# Patient Record
Sex: MALE | Race: BLACK OR AFRICAN AMERICAN | NOT HISPANIC OR LATINO | URBAN - METROPOLITAN AREA
[De-identification: names, ages, dates, MRNs, and addresses within clinical notes are randomized per-mention and may not be internally consistent; named-entity substitution may affect disease eponyms.]

---

## 2017-08-31 ENCOUNTER — OFFICE (OUTPATIENT)
Dept: URBAN - METROPOLITAN AREA OTHER 6 | Facility: OTHER | Age: 27
End: 2017-08-31

## 2017-08-31 VITALS
SYSTOLIC BLOOD PRESSURE: 130 MMHG | WEIGHT: 192 LBS | HEIGHT: 65 IN | HEART RATE: 68 BPM | DIASTOLIC BLOOD PRESSURE: 80 MMHG

## 2017-08-31 DIAGNOSIS — K50.90 CROHN'S DISEASE, UNSPECIFIED, WITHOUT COMPLICATIONS: ICD-10-CM

## 2017-08-31 PROCEDURE — 99212 OFFICE O/P EST SF 10 MIN: CPT

## 2018-02-26 ENCOUNTER — OFFICE (OUTPATIENT)
Dept: URBAN - METROPOLITAN AREA CLINIC 71 | Facility: CLINIC | Age: 28
End: 2018-02-26

## 2018-02-26 VITALS
WEIGHT: 188 LBS | HEART RATE: 60 BPM | DIASTOLIC BLOOD PRESSURE: 80 MMHG | HEIGHT: 65 IN | SYSTOLIC BLOOD PRESSURE: 124 MMHG

## 2018-02-26 DIAGNOSIS — K50.90 CROHN'S DISEASE, UNSPECIFIED, WITHOUT COMPLICATIONS: ICD-10-CM

## 2018-02-26 PROCEDURE — 99213 OFFICE O/P EST LOW 20 MIN: CPT

## 2018-06-04 ENCOUNTER — TELEPHONE (OUTPATIENT)
Dept: GASTROENTEROLOGY | Facility: CLINIC | Age: 28
End: 2018-06-04

## 2018-06-04 NOTE — TELEPHONE ENCOUNTER
CALLED STATING NEEDING MORE REFILL ON MERCAPTOPURINE 50MG ONCE DAY.  ADVISE HIM TO CALL HIS PHARMANCY, LUCIO MOURA, HAVE THEM SEND E-REQUEST.  HE WILL CALL GIANCARLO

## 2018-06-06 ENCOUNTER — TELEPHONE (OUTPATIENT)
Dept: GASTROENTEROLOGY | Facility: CLINIC | Age: 28
End: 2018-06-06

## 2018-06-15 RX ORDER — MERCAPTOPURINE 50 MG/1
50 TABLET ORAL DAILY
Qty: 90 TABLET | Refills: 0 | Status: SHIPPED | OUTPATIENT
Start: 2018-06-15 | End: 2018-09-21 | Stop reason: SDUPTHER

## 2018-08-27 ENCOUNTER — OFFICE VISIT (OUTPATIENT)
Dept: GASTROENTEROLOGY | Facility: CLINIC | Age: 28
End: 2018-08-27

## 2018-08-27 VITALS
SYSTOLIC BLOOD PRESSURE: 124 MMHG | WEIGHT: 200 LBS | HEIGHT: 65 IN | BODY MASS INDEX: 33.32 KG/M2 | DIASTOLIC BLOOD PRESSURE: 78 MMHG

## 2018-08-27 DIAGNOSIS — K50.00 CROHN'S DISEASE OF SMALL INTESTINE WITHOUT COMPLICATION (HCC): Primary | ICD-10-CM

## 2018-08-27 PROCEDURE — 99213 OFFICE O/P EST LOW 20 MIN: CPT | Performed by: INTERNAL MEDICINE

## 2018-08-27 RX ORDER — SERTRALINE HYDROCHLORIDE 100 MG/1
100 TABLET, FILM COATED ORAL DAILY
COMMUNITY
Start: 2018-06-19

## 2018-08-27 NOTE — PROGRESS NOTES
PATIENT INFORMATION  Lawson Felix       - 1990    CHIEF COMPLAINT  Chief Complaint   Patient presents with   • Crohn's Disease     6 mo follow up       HISTORY OF PRESENT ILLNESS  Here for CD follow u and no GI complaints and is stable o his azathioprine and his last colon was 2016 so due next May and had labs drawn at [facility]   No other health issues and we reviewed Biologics however he is doing so wel and tolerting treatment ifhis labs are good will stay the course      Crohn's Disease   This is a chronic problem. The current episode started more than 1 year ago. The problem occurs rarely. The problem has been unchanged. Associated symptoms include fatigue. Nothing aggravates the symptoms. Treatments tried: Meds are unchanged for years. The treatment provided significant relief.           REVIEW OF SYSTEMS  Review of Systems   Constitutional: Positive for fatigue.   All other systems reviewed and are negative.        ACTIVE PROBLEMS  There are no active problems to display for this patient.        PAST MEDICAL HISTORY  Past Medical History:   Diagnosis Date   • Crohn's disease (CMS/HCC)          SURGICAL HISTORY  Past Surgical History:   Procedure Laterality Date   • COLONOSCOPY  2016         FAMILY HISTORY  Family History   Problem Relation Age of Onset   • Colon cancer Neg Hx    • Colon polyps Neg Hx          SOCIAL HISTORY  Social History     Occupational History   • Not on file.     Social History Main Topics   • Smoking status: Never Smoker   • Smokeless tobacco: Never Used   • Alcohol use No   • Drug use: Unknown   • Sexual activity: Not on file         CURRENT MEDICATIONS    Current Outpatient Prescriptions:   •  mercaptopurine (PURINETHOL) 50 MG chemo tablet, Take 1 tablet by mouth Daily., Disp: 90 tablet, Rfl: 0  •  sertraline (ZOLOFT) 100 MG tablet, , Disp: , Rfl:     ALLERGIES  Patient has no known allergies.    VITALS  Vitals:    18 1407   BP: 124/78   Weight: 90.7 kg  "(200 lb)   Height: 165.1 cm (65\")       LAST RESULTS   Lab Requisition on 05/16/2016   Component Date Value Ref Range Status   • Case Report 05/16/2016    Final                    Value:Surgical Pathology Report                         Case: [id number]                                  Authorizing Provider:  Mark Cruz, Collected:           05/16/2016 10:34 AM                                 MD                                                                           Pathologist:           Denis Shahid MD      Received:            05/17/2016 08:01 AM          Specimens:   1) - Colon, Rt Colon Bx                                                                             2) - Colon, Transverse Bx                                                                           3) - Colon, Sigmoid Bx                                                                              4) - Colon, Rectal Bx                                                                     • Final Diagnosis 05/16/2016    Final                    Value:This result contains rich text formatting which cannot be displayed here.   • Gross Description 05/16/2016    Final                    Value:This result contains rich text formatting which cannot be displayed here.   • Microscopic Description 05/16/2016    Final                    Value:This result contains rich text formatting which cannot be displayed here.     No results found.    PHYSICAL EXAM  Physical Exam   Constitutional: He is oriented to person, place, and time. He appears well-developed and well-nourished.   HENT:   Head: Normocephalic and atraumatic.   Eyes: Pupils are equal, round, and reactive to light. Conjunctivae and EOM are normal. No scleral icterus.   Neck: Normal range of motion. Neck supple. No thyromegaly present.   Cardiovascular: Normal rate, regular rhythm, normal heart sounds and intact distal pulses.  Exam reveals no gallop.    No murmur " heard.  Pulmonary/Chest: Effort normal and breath sounds normal. He has no wheezes. He has no rales.   Abdominal: Soft. Bowel sounds are normal. He exhibits no shifting dullness, no distension, no fluid wave, no abdominal bruit, no ascites and no mass. There is no hepatosplenomegaly. There is no tenderness. There is no guarding and negative Watkins's sign. Hernia confirmed negative in the ventral area.   Musculoskeletal: Normal range of motion. He exhibits no edema.   Lymphadenopathy:     He has no cervical adenopathy.   Neurological: He is alert and oriented to person, place, and time.   Skin: Skin is warm and dry. No rash noted. He is not diaphoretic. No erythema.   Psychiatric: He has a normal mood and affect.       ASSESSMENT  Diagnoses and all orders for this visit:    Crohn's disease of small intestine without complication (CMS/HCC)    Other orders  -     sertraline (ZOLOFT) 100 MG tablet;           PLAN  Return in about 6 months (around 2/27/2019).    Consider [facility] labs next visit.

## 2018-09-19 RX ORDER — MERCAPTOPURINE 50 MG/1
TABLET ORAL
Qty: 90 TABLET | Refills: 0 | Status: CANCELLED | OUTPATIENT
Start: 2018-09-19

## 2018-09-21 RX ORDER — MERCAPTOPURINE 50 MG/1
50 TABLET ORAL DAILY
Qty: 90 TABLET | Refills: 0 | Status: SHIPPED | OUTPATIENT
Start: 2018-09-21 | End: 2019-01-03 | Stop reason: SDUPTHER

## 2018-09-24 RX ORDER — MERCAPTOPURINE 50 MG/1
TABLET ORAL
Qty: 90 TABLET | Refills: 0 | OUTPATIENT
Start: 2018-09-24

## 2019-01-09 RX ORDER — MERCAPTOPURINE 50 MG/1
50 TABLET ORAL DAILY
Qty: 90 TABLET | Refills: 0 | Status: SHIPPED | OUTPATIENT
Start: 2019-01-09 | End: 2019-07-14 | Stop reason: SDUPTHER

## 2019-02-25 ENCOUNTER — OFFICE VISIT (OUTPATIENT)
Dept: GASTROENTEROLOGY | Facility: CLINIC | Age: 29
End: 2019-02-25

## 2019-02-25 VITALS
SYSTOLIC BLOOD PRESSURE: 126 MMHG | BODY MASS INDEX: 34.52 KG/M2 | WEIGHT: 207.2 LBS | HEIGHT: 65 IN | DIASTOLIC BLOOD PRESSURE: 76 MMHG

## 2019-02-25 DIAGNOSIS — K50.00 CROHN'S DISEASE OF SMALL INTESTINE WITHOUT COMPLICATION (HCC): Primary | ICD-10-CM

## 2019-02-25 PROCEDURE — 99213 OFFICE O/P EST LOW 20 MIN: CPT | Performed by: INTERNAL MEDICINE

## 2019-02-25 NOTE — PROGRESS NOTES
"    PATIENT INFORMATION  Lawson Felix       - 1990    CHIEF COMPLAINT  Chief Complaint   Patient presents with   • Follow-up     6 mo follow up on crohn's       HISTORY OF PRESENT ILLNESS  Baseline BMs 2-3 a day and no bloting nor Nausea and no weith change, Reviewed his med and he is stable on 6MP bu tno recent CBC (last 2017)    Last colon was 2016 so due this year in May.            REVIEW OF SYSTEMS  Review of Systems   All other systems reviewed and are negative.        ACTIVE PROBLEMS  There are no active problems to display for this patient.        PAST MEDICAL HISTORY  Past Medical History:   Diagnosis Date   • Crohn's disease (CMS/HCC)          SURGICAL HISTORY  Past Surgical History:   Procedure Laterality Date   • COLONOSCOPY  2016         FAMILY HISTORY  Family History   Problem Relation Age of Onset   • Colon cancer Neg Hx    • Colon polyps Neg Hx          SOCIAL HISTORY  Social History     Occupational History   • Not on file   Tobacco Use   • Smoking status: Never Smoker   • Smokeless tobacco: Never Used   Substance and Sexual Activity   • Alcohol use: No   • Drug use: Not on file   • Sexual activity: Not on file       Debilities/Disabilities Identified: None    Emotional Behavior: Appropriate    CURRENT MEDICATIONS    Current Outpatient Medications:   •  mercaptopurine (PURINETHOL) 50 MG chemo tablet, Take 1 tablet by mouth Daily., Disp: 90 tablet, Rfl: 0  •  sertraline (ZOLOFT) 100 MG tablet, , Disp: , Rfl:     ALLERGIES  Patient has no known allergies.    VITALS  Vitals:    19 1351   BP: 126/76   Weight: 94 kg (207 lb 3.2 oz)   Height: 165.1 cm (65\")       LAST RESULTS   Lab Requisition on 2016   Component Date Value Ref Range Status   • Case Report 2016    Final                    Value:Surgical Pathology Report                         Case: [id number]                                  Authorizing Provider:  Mark Cruz, Collected:           " 05/16/2016 10:34 AM                                 MD                                                                           Pathologist:           Denis Shahid MD      Received:            05/17/2016 08:01 AM          Specimens:   1) - Colon, Rt Colon Bx                                                                             2) - Colon, Transverse Bx                                                                           3) - Colon, Sigmoid Bx                                                                              4) - Colon, Rectal Bx                                                                     • Final Diagnosis 05/16/2016    Final                    Value:This result contains rich text formatting which cannot be displayed here.   • Gross Description 05/16/2016    Final                    Value:This result contains rich text formatting which cannot be displayed here.   • Microscopic Description 05/16/2016    Final                    Value:This result contains rich text formatting which cannot be displayed here.     No results found.    PHYSICAL EXAM  Physical Exam   Constitutional: He is oriented to person, place, and time. He appears well-developed and well-nourished.   HENT:   Head: Normocephalic and atraumatic.   Eyes: Conjunctivae and EOM are normal. Pupils are equal, round, and reactive to light. No scleral icterus.   Neck: Normal range of motion. Neck supple. No thyromegaly present.   Cardiovascular: Normal rate, regular rhythm, normal heart sounds and intact distal pulses. Exam reveals no gallop.   No murmur heard.  Pulmonary/Chest: Effort normal and breath sounds normal. He has no wheezes. He has no rales.   Abdominal: Soft. Bowel sounds are normal. He exhibits no shifting dullness, no distension, no fluid wave, no abdominal bruit, no ascites and no mass. There is no hepatosplenomegaly. There is no tenderness. There is no guarding and negative Watkins's sign. Hernia confirmed  negative in the ventral area.   Musculoskeletal: Normal range of motion. He exhibits no edema.   Lymphadenopathy:     He has no cervical adenopathy.   Neurological: He is alert and oriented to person, place, and time.   Skin: Skin is warm and dry. No rash noted. He is not diaphoretic. No erythema.   Psychiatric: He has a normal mood and affect. His behavior is normal.       ASSESSMENT  Diagnoses and all orders for this visit:    Crohn's disease of small intestine without complication (CMS/[facility])  -     CBC & Differential  -     External Facility Surgical / Procedural Request; Future    Other orders  -     Obtain informed consent; Future          PLAN    Tashi see 6 months after his Colonoscopy in May, Will call with CBC results.  No Follow-up on file.

## 2019-05-13 ENCOUNTER — OUTSIDE FACILITY SERVICE (OUTPATIENT)
Dept: GASTROENTEROLOGY | Facility: CLINIC | Age: 29
End: 2019-05-13

## 2019-05-13 ENCOUNTER — LAB REQUISITION (OUTPATIENT)
Dept: LAB | Facility: HOSPITAL | Age: 29
End: 2019-05-13

## 2019-05-13 DIAGNOSIS — K50.00 CROHN'S DISEASE OF SMALL INTESTINE WITHOUT COMPLICATION (HCC): ICD-10-CM

## 2019-05-13 PROCEDURE — 88305 TISSUE EXAM BY PATHOLOGIST: CPT | Performed by: INTERNAL MEDICINE

## 2019-05-13 PROCEDURE — 45380 COLONOSCOPY AND BIOPSY: CPT | Performed by: INTERNAL MEDICINE

## 2019-05-14 LAB
CYTO UR: NORMAL
LAB AP CASE REPORT: NORMAL
PATH REPORT.FINAL DX SPEC: NORMAL
PATH REPORT.GROSS SPEC: NORMAL

## 2019-07-23 RX ORDER — MERCAPTOPURINE 50 MG/1
TABLET ORAL
Qty: 90 TABLET | Refills: 0 | Status: SHIPPED | OUTPATIENT
Start: 2019-07-23 | End: 2019-10-17 | Stop reason: SDUPTHER

## 2019-10-18 RX ORDER — MERCAPTOPURINE 50 MG/1
50 TABLET ORAL DAILY
Qty: 90 TABLET | Refills: 0 | Status: SHIPPED | OUTPATIENT
Start: 2019-10-18 | End: 2020-01-21

## 2019-11-21 ENCOUNTER — OFFICE VISIT (OUTPATIENT)
Dept: GASTROENTEROLOGY | Facility: CLINIC | Age: 29
End: 2019-11-21

## 2019-11-21 VITALS
DIASTOLIC BLOOD PRESSURE: 74 MMHG | WEIGHT: 206.2 LBS | BODY MASS INDEX: 34.35 KG/M2 | HEIGHT: 65 IN | SYSTOLIC BLOOD PRESSURE: 128 MMHG

## 2019-11-21 DIAGNOSIS — K50.10 CROHN'S DISEASE OF LARGE INTESTINE WITHOUT COMPLICATION (HCC): Primary | ICD-10-CM

## 2019-11-21 PROCEDURE — 99213 OFFICE O/P EST LOW 20 MIN: CPT | Performed by: INTERNAL MEDICINE

## 2019-11-21 NOTE — PROGRESS NOTES
"    PATIENT INFORMATION  Lawson Felix       - 1990    CHIEF COMPLAINT  Chief Complaint   Patient presents with   • Follow-up     6 mo follow up on Crohn's       HISTORY OF PRESENT ILLNESS  BMs regular after meals mostly 1-2 a day and sometimes 3 , No recent illnesses and O/W doing fine.     Labs from may show WBC 4.4 and normal LFTs    Reviewed his colon in 2019 so will recall in 3 years and keep him on his .5mg/kg dose due to its efficaciousness    Watch his WBC and follow            REVIEW OF SYSTEMS  Review of Systems   Respiratory: Positive for apnea.    All other systems reviewed and are negative.        ACTIVE PROBLEMS  There are no active problems to display for this patient.        PAST MEDICAL HISTORY  Past Medical History:   Diagnosis Date   • Crohn's disease (CMS/HCC)    • Sleep apnea          SURGICAL HISTORY  Past Surgical History:   Procedure Laterality Date   • COLONOSCOPY  2016         FAMILY HISTORY  Family History   Problem Relation Age of Onset   • Colon cancer Neg Hx    • Colon polyps Neg Hx          SOCIAL HISTORY  Social History     Occupational History   • Not on file   Tobacco Use   • Smoking status: Never Smoker   • Smokeless tobacco: Never Used   Substance and Sexual Activity   • Alcohol use: No   • Drug use: Not on file   • Sexual activity: Not on file       Debilities/Disabilities Identified: None    Emotional Behavior: Appropriate    CURRENT MEDICATIONS    Current Outpatient Medications:   •  mercaptopurine (PURINETHOL) 50 MG chemo tablet, Take 1 tablet by mouth Daily., Disp: 90 tablet, Rfl: 0  •  sertraline (ZOLOFT) 100 MG tablet, , Disp: , Rfl:     ALLERGIES  Patient has no known allergies.    VITALS  Vitals:    19 1044   BP: 128/74   Weight: 93.5 kg (206 lb 3.2 oz)   Height: 165.1 cm (65\")       LAST RESULTS   Lab Requisition on 2019   Component Date Value Ref Range Status   • Case Report 2019    Final                    Value:Surgical Pathology " Report                         Case: [id number]                                  Authorizing Provider:  Mark Cruz        Collected:           05/13/2019 08:40 AM                                 MD Thomas                                                                   Pathologist:           Prisca Lemos MD        Received:            05/13/2019 03:34 PM          Specimens:   1) - Colon, right colon bx                                                                          2) - Colon, left colon bx                                                                 • Final Diagnosis 05/13/2019    Final                    Value:This result contains rich text formatting which cannot be displayed here.   • Gross Description 05/13/2019    Final                    Value:This result contains rich text formatting which cannot be displayed here.   • Microscopic Description 05/13/2019    Final                    Value:This result contains rich text formatting which cannot be displayed here.     No results found.    PHYSICAL EXAM  Physical Exam   Constitutional: He is oriented to person, place, and time. He appears well-developed and well-nourished.   HENT:   Head: Normocephalic and atraumatic.   Eyes: Conjunctivae and EOM are normal. Pupils are equal, round, and reactive to light. No scleral icterus.   Neck: Normal range of motion. Neck supple. No thyromegaly present.   Cardiovascular: Normal rate, regular rhythm, normal heart sounds and intact distal pulses. Exam reveals no gallop.   No murmur heard.  Pulmonary/Chest: Effort normal and breath sounds normal. He has no wheezes. He has no rales.   Abdominal: Soft. Bowel sounds are normal. He exhibits no shifting dullness, no distension, no fluid wave, no abdominal bruit, no ascites and no mass. There is no hepatosplenomegaly. There is tenderness in the right lower quadrant. There is no guarding and negative Watkins's sign. Hernia confirmed negative in the ventral  area.   Minimal tenderness to Deep palpation   Musculoskeletal: Normal range of motion. He exhibits no edema.   Lymphadenopathy:     He has no cervical adenopathy.   Neurological: He is alert and oriented to person, place, and time.   Skin: Skin is warm and dry. No rash noted. He is not diaphoretic. No erythema.       ASSESSMENT  Diagnoses and all orders for this visit:    Crohn's disease of large intestine without complication (CMS/HCC)          PLAN  Stick with present dose of Immuran  Recall colon for 5/2021  Return in about 1 year (around 11/21/2020).

## 2020-01-21 RX ORDER — MERCAPTOPURINE 50 MG/1
TABLET ORAL
Qty: 90 TABLET | Refills: 3 | Status: SHIPPED | OUTPATIENT
Start: 2020-01-21 | End: 2020-10-14 | Stop reason: SDUPTHER

## 2020-10-14 RX ORDER — MERCAPTOPURINE 50 MG/1
50 TABLET ORAL DAILY
Qty: 90 TABLET | Refills: 3 | Status: SHIPPED | OUTPATIENT
Start: 2020-10-14 | End: 2021-11-17

## 2020-11-19 ENCOUNTER — OFFICE VISIT (OUTPATIENT)
Dept: GASTROENTEROLOGY | Facility: CLINIC | Age: 30
End: 2020-11-19

## 2020-11-19 VITALS — BODY MASS INDEX: 33.95 KG/M2 | HEIGHT: 65 IN | WEIGHT: 203.8 LBS | TEMPERATURE: 98.2 F

## 2020-11-19 DIAGNOSIS — K50.10 CROHN'S DISEASE OF LARGE INTESTINE WITHOUT COMPLICATION (HCC): Primary | ICD-10-CM

## 2020-11-19 PROCEDURE — 99213 OFFICE O/P EST LOW 20 MIN: CPT | Performed by: INTERNAL MEDICINE

## 2020-11-19 NOTE — PROGRESS NOTES
PATIENT INFORMATION  Lawson Felix       - 1990    CHIEF COMPLAINT  Chief Complaint   Patient presents with   • Follow-up     1 yr follow up on Crohns       HISTORY OF PRESENT ILLNESS  Annual F/U of Colon Crohns and is maintained on his .5mg/kg dose of Imuran and doing well Labs last in 2020    No COVID exposure and we reviewed recs for IBD patient s at this time. His last Colon was in 2019- normal Biopsy    No other illnesses and no arthritis or other X-tra intestinal manifestaions      REVIEWED PERTINENT RESULTS/ LABS  Lab Results   Component Value Date    CASEREPORT  2019     Surgical Pathology Report                         Case: [id number]                                  Authorizing Provider:  Mark Cruz        Collected:           2019 08:40 AM                                 MD Thomas                                                                   Pathologist:           Prisca Lemos MD        Received:            2019 03:34 PM          Specimens:   1) - Colon, right colon bx                                                                          2) - Colon, left colon bx                                                                  FINALDX  2019     1.  Colon, Right, Biopsy:  Benign colonic mucosa with   A. No hyperplastic or tubulovillous change.   B. No significant inflammation.   C. No crypt distortion or basement membrane thickening.   D. No viral inclusions or other organisms on routinely stained sections.     2.  Colon, Left, Biopsy:  Benign colonic mucosa with   A. No hyperplastic or tubulovillous change.   B. No significant inflammation.   C. No crypt distortion or basement membrane thickening.   D. No viral inclusions or other organisms on routinely stained sections.     mec/brb        No results found for: HGB, MCV, PLT, ALT, AST, HGBA1C, GFR, INR, TRIG, FERRITIN, IRON, TIBC   No results found.    REVIEW OF SYSTEMS  Review of Systems  "  All other systems reviewed and are negative.        ACTIVE PROBLEMS  Patient Active Problem List    Diagnosis   • Crohn's disease of large intestine without complication (CMS/HCC) [K50.10]         PAST MEDICAL HISTORY  Past Medical History:   Diagnosis Date   • Crohn's disease (CMS/HCC)    • Sleep apnea          SURGICAL HISTORY  Past Surgical History:   Procedure Laterality Date   • COLONOSCOPY  05/16/2016         FAMILY HISTORY  Family History   Problem Relation Age of Onset   • Colon cancer Neg Hx    • Colon polyps Neg Hx          SOCIAL HISTORY  Social History     Occupational History   • Not on file   Tobacco Use   • Smoking status: Never Smoker   • Smokeless tobacco: Never Used   Substance and Sexual Activity   • Alcohol use: No   • Drug use: Not on file   • Sexual activity: Not on file         CURRENT MEDICATIONS    Current Outpatient Medications:   •  mercaptopurine (PURINETHOL) 50 MG chemo tablet, Take 1 tablet by mouth Daily., Disp: 90 tablet, Rfl: 3  •  sertraline (ZOLOFT) 100 MG tablet, , Disp: , Rfl:     ALLERGIES  Patient has no known allergies.    VITALS  Vitals:    11/19/20 1003   Temp: 98.2 °F (36.8 °C)   TempSrc: Temporal   Weight: 92.4 kg (203 lb 12.8 oz)   Height: 165.1 cm (65\")       PHYSICAL EXAM  Debilities/Disabilities Identified: None  Emotional Behavior: Appropriate  Wt Readings from Last 3 Encounters:   11/19/20 92.4 kg (203 lb 12.8 oz)   11/21/19 93.5 kg (206 lb 3.2 oz)   02/25/19 94 kg (207 lb 3.2 oz)     Ht Readings from Last 1 Encounters:   11/19/20 165.1 cm (65\")     Body mass index is 33.91 kg/m².  Physical Exam  Constitutional:       Appearance: He is well-developed.   HENT:      Head: Normocephalic and atraumatic.   Eyes:      General: No scleral icterus.     Pupils: Pupils are equal, round, and reactive to light.   Neck:      Musculoskeletal: Normal range of motion and neck supple.      Thyroid: No thyromegaly.   Cardiovascular:      Rate and Rhythm: Normal rate and regular " rhythm.      Heart sounds: Normal heart sounds. No murmur. No gallop.    Pulmonary:      Effort: Pulmonary effort is normal.      Breath sounds: Normal breath sounds. No wheezing or rales.   Abdominal:      General: Bowel sounds are normal. There is no distension or abdominal bruit.      Palpations: Abdomen is soft. Abdomen is not rigid. There is no shifting dullness, fluid wave, hepatomegaly, splenomegaly, mass or pulsatile mass.      Tenderness: There is no abdominal tenderness. There is no guarding or rebound. Negative signs include Watkins's sign.      Hernia: No hernia is present. There is no hernia in the ventral area.   Musculoskeletal: Normal range of motion.   Lymphadenopathy:      Cervical: No cervical adenopathy.   Skin:     General: Skin is warm and dry.      Findings: No erythema or rash.   Neurological:      Mental Status: He is alert and oriented to person, place, and time.   Psychiatric:         Mood and Affect: Mood normal.         Behavior: Behavior normal.         CLINICAL DATA REVIEWED   reviewed previous lab results and integrated with today's visit, reviewed notes from other physicians and/or last GI encounter, reviewed previous endoscopy results and available photos, reviewed surgical pathology results from previous biopsies    ASSESSMENT  Diagnoses and all orders for this visit:    Crohn's disease of large intestine without complication (CMS/HCC)          PLAN  Continue Labs with PCP io May and annual follow up here as well as Q3 year colon w biopsy    Return in about 1 year (around 11/19/2021).    I have discussed the above plan with the patient.  They verbalize understanding and are in agreement with the plan.  They have been advised to contact the office for any questions, concerns, or changes related to their health.

## 2021-11-17 RX ORDER — MERCAPTOPURINE 50 MG/1
TABLET ORAL
Qty: 90 TABLET | Refills: 3 | Status: SHIPPED | OUTPATIENT
Start: 2021-11-17 | End: 2022-12-05

## 2021-11-17 NOTE — TELEPHONE ENCOUNTER
LOV: 11/21/20    Annual F/U of Colon Crohns and is maintained on his .5mg/kg dose of Imuran and doing well Labs last in 5/2020  His last Colon was in 5/2019- normal Biopsy    Med list Purinethol 50mg and Zoloft 100mg

## 2022-06-08 ENCOUNTER — TELEPHONE (OUTPATIENT)
Dept: GASTROENTEROLOGY | Facility: CLINIC | Age: 32
End: 2022-06-08

## 2022-06-08 ENCOUNTER — PREP FOR SURGERY (OUTPATIENT)
Dept: OTHER | Facility: HOSPITAL | Age: 32
End: 2022-06-08

## 2022-06-08 DIAGNOSIS — K62.5 RECTAL BLEEDING: ICD-10-CM

## 2022-06-08 DIAGNOSIS — K50.10 CROHN'S DISEASE OF COLON WITHOUT COMPLICATION: Primary | ICD-10-CM

## 2022-06-08 NOTE — TELEPHONE ENCOUNTER
FAST TRACK - 3 YEAR RECALL  LAST COLONOSCOPY 05/13/2019  HX [facility]  SCHEDULE AT [place]    DID CLEMENT:  RECTAL BLEEDING    **OFFICE VISIT?  HISTORY [facility] AND CLEMENT RECTAL BLEEDING?**

## 2022-06-09 PROBLEM — K62.5 RECTAL BLEEDING: Status: ACTIVE | Noted: 2022-06-09

## 2022-06-09 NOTE — TELEPHONE ENCOUNTER
Scheduled at [place] with Dr. Cruz 10/28/2022 at 2:45pm - arrive 1:30pm.  Will mail instructions.

## 2022-10-27 ENCOUNTER — ANESTHESIA EVENT (OUTPATIENT)
Dept: PERIOP | Facility: HOSPITAL | Age: 32
End: 2022-10-27

## 2022-10-28 ENCOUNTER — ANESTHESIA (OUTPATIENT)
Dept: PERIOP | Facility: HOSPITAL | Age: 32
End: 2022-10-28

## 2022-10-28 ENCOUNTER — HOSPITAL ENCOUNTER (OUTPATIENT)
Facility: HOSPITAL | Age: 32
Setting detail: HOSPITAL OUTPATIENT SURGERY
Discharge: HOME | End: 2022-10-28
Attending: INTERNAL MEDICINE | Admitting: INTERNAL MEDICINE

## 2022-10-28 VITALS
DIASTOLIC BLOOD PRESSURE: 84 MMHG | OXYGEN SATURATION: 100 % | RESPIRATION RATE: 14 BRPM | HEIGHT: 66 IN | BODY MASS INDEX: 31.24 KG/M2 | TEMPERATURE: 97.5 F | HEART RATE: 56 BPM | SYSTOLIC BLOOD PRESSURE: 120 MMHG | WEIGHT: 194.4 LBS

## 2022-10-28 DIAGNOSIS — K50.10 CROHN'S DISEASE OF COLON WITHOUT COMPLICATION: ICD-10-CM

## 2022-10-28 DIAGNOSIS — K62.5 RECTAL BLEEDING: ICD-10-CM

## 2022-10-28 PROCEDURE — 25010000002 PROPOFOL 200 MG/20ML EMULSION: Performed by: NURSE ANESTHETIST, CERTIFIED REGISTERED

## 2022-10-28 PROCEDURE — 45380 COLONOSCOPY AND BIOPSY: CPT | Performed by: INTERNAL MEDICINE

## 2022-10-28 PROCEDURE — 88305 TISSUE EXAM BY PATHOLOGIST: CPT | Performed by: INTERNAL MEDICINE

## 2022-10-28 RX ORDER — MAGNESIUM HYDROXIDE 1200 MG/15ML
LIQUID ORAL AS NEEDED
Status: DISCONTINUED | OUTPATIENT
Start: 2022-10-28 | End: 2022-10-28 | Stop reason: HOSPADM

## 2022-10-28 RX ORDER — SODIUM CHLORIDE 9 MG/ML
40 INJECTION, SOLUTION INTRAVENOUS AS NEEDED
Status: DISCONTINUED | OUTPATIENT
Start: 2022-10-28 | End: 2022-10-28 | Stop reason: HOSPADM

## 2022-10-28 RX ORDER — PROPOFOL 10 MG/ML
INJECTION, EMULSION INTRAVENOUS AS NEEDED
Status: DISCONTINUED | OUTPATIENT
Start: 2022-10-28 | End: 2022-10-28 | Stop reason: SURG

## 2022-10-28 RX ORDER — SODIUM CHLORIDE, SODIUM LACTATE, POTASSIUM CHLORIDE, CALCIUM CHLORIDE 600; 310; 30; 20 MG/100ML; MG/100ML; MG/100ML; MG/100ML
9 INJECTION, SOLUTION INTRAVENOUS CONTINUOUS
Status: DISCONTINUED | OUTPATIENT
Start: 2022-10-28 | End: 2022-10-28 | Stop reason: HOSPADM

## 2022-10-28 RX ORDER — LIDOCAINE HYDROCHLORIDE 10 MG/ML
0.5 INJECTION, SOLUTION EPIDURAL; INFILTRATION; INTRACAUDAL; PERINEURAL ONCE AS NEEDED
Status: DISCONTINUED | OUTPATIENT
Start: 2022-10-28 | End: 2022-10-28 | Stop reason: HOSPADM

## 2022-10-28 RX ORDER — SODIUM CHLORIDE 0.9 % (FLUSH) 0.9 %
10 SYRINGE (ML) INJECTION AS NEEDED
Status: DISCONTINUED | OUTPATIENT
Start: 2022-10-28 | End: 2022-10-28 | Stop reason: HOSPADM

## 2022-10-28 RX ORDER — SODIUM CHLORIDE 0.9 % (FLUSH) 0.9 %
10 SYRINGE (ML) INJECTION EVERY 12 HOURS SCHEDULED
Status: DISCONTINUED | OUTPATIENT
Start: 2022-10-28 | End: 2022-10-28 | Stop reason: HOSPADM

## 2022-10-28 RX ADMIN — PROPOFOL 350 MG: 10 INJECTION, EMULSION INTRAVENOUS at 13:01

## 2022-10-28 RX ADMIN — SODIUM CHLORIDE, POTASSIUM CHLORIDE, SODIUM LACTATE AND CALCIUM CHLORIDE 9 ML/HR: 600; 310; 30; 20 INJECTION, SOLUTION INTRAVENOUS at 12:00

## 2022-10-28 NOTE — ANESTHESIA POSTPROCEDURE EVALUATION
Patient: Lawson Felix    Procedure Summary     Date: 10/28/22 Room / Location: [facility] ENDOSCOPY 1 /  [facility]    Anesthesia Start: 1258 Anesthesia Stop: 1322    Procedure: COLONOSCOPY WITH BIOPSY Diagnosis:       Crohn's disease of colon without complication (HCC)      Rectal bleeding      (Crohn's disease of colon without complication (HCC) [K50.10])      (Rectal bleeding [K62.5])    Surgeons: Mark Cruz MD Provider: Dennis Chiu CRNA    Anesthesia Type: MAC ASA Status: 2          Anesthesia Type: MAC    Vitals  Vitals Value Taken Time   BP 96/58 10/28/22 1331   Temp 97.5 °F (36.4 °C) 10/28/22 1331   Pulse 63 10/28/22 1331   Resp 12 10/28/22 1331   SpO2 96 % 10/28/22 1331           Post Anesthesia Care and Evaluation    Patient location during evaluation: PHASE II  Patient participation: complete - patient participated  Level of consciousness: awake and alert  Pain score: 0  Pain management: adequate    Airway patency: patent  Anesthetic complications: No anesthetic complications  PONV Status: none  Cardiovascular status: acceptable  Respiratory status: acceptable  Hydration status: acceptable

## 2022-10-28 NOTE — ANESTHESIA PREPROCEDURE EVALUATION
Anesthesia Evaluation     Patient summary reviewed and Nursing notes reviewed   no history of anesthetic complications:  NPO Solid Status: > 8 hours  NPO Liquid Status: > 8 hours           Airway   Mallampati: II  TM distance: >3 FB  Neck ROM: full  No difficulty expected  Dental - normal exam     Pulmonary - normal exam    breath sounds clear to auscultation  (+) sleep apnea on CPAP,   Cardiovascular - negative cardio ROS and normal exam  Exercise tolerance: good (4-7 METS)    Rhythm: regular  Rate: normal        Neuro/Psych  (+) psychiatric history Depression,    GI/Hepatic/Renal/Endo - negative ROS     Musculoskeletal (-) negative ROS    Abdominal  - normal exam   Substance History - negative use     OB/GYN          Other - negative ROS                       Anesthesia Plan    ASA 2     MAC     intravenous induction     Anesthetic plan, risks, benefits, and alternatives have been provided, discussed and informed consent has been obtained with: patient.    Use of blood products discussed with patient  Consented to blood products.       CODE STATUS:

## 2022-10-31 LAB
LAB AP CASE REPORT: NORMAL
PATH REPORT.FINAL DX SPEC: NORMAL
PATH REPORT.GROSS SPEC: NORMAL

## 2022-12-05 RX ORDER — MERCAPTOPURINE 50 MG/1
TABLET ORAL
Qty: 90 TABLET | Refills: 3 | Status: SHIPPED | OUTPATIENT
Start: 2022-12-05

## 2023-06-07 ENCOUNTER — TELEPHONE (OUTPATIENT)
Dept: GASTROENTEROLOGY | Facility: CLINIC | Age: 33
End: 2023-06-07
Payer: COMMERCIAL

## 2023-06-07 NOTE — TELEPHONE ENCOUNTER
Pt past due for follow up for Crohns  My chart reminder sent    See previous encounters of notifications my chart/ Letters mailed     Okay to schedule with DANIS

## 2023-11-07 ENCOUNTER — OFFICE VISIT (OUTPATIENT)
Dept: GASTROENTEROLOGY | Facility: CLINIC | Age: 33
End: 2023-11-07
Payer: COMMERCIAL

## 2023-11-07 ENCOUNTER — LAB (OUTPATIENT)
Dept: LAB | Facility: HOSPITAL | Age: 33
End: 2023-11-07
Payer: COMMERCIAL

## 2023-11-07 VITALS
SYSTOLIC BLOOD PRESSURE: 120 MMHG | BODY MASS INDEX: 31.82 KG/M2 | WEIGHT: 198 LBS | HEIGHT: 66 IN | DIASTOLIC BLOOD PRESSURE: 82 MMHG

## 2023-11-07 DIAGNOSIS — K50.10 CROHN'S DISEASE OF LARGE INTESTINE WITHOUT COMPLICATION: Primary | ICD-10-CM

## 2023-11-07 DIAGNOSIS — K50.10 CROHN'S DISEASE OF LARGE INTESTINE WITHOUT COMPLICATION: ICD-10-CM

## 2023-11-07 LAB
ALBUMIN SERPL-MCNC: 4.3 G/DL (ref 3.5–5.2)
ALBUMIN/GLOB SERPL: 1.3 G/DL
ALP SERPL-CCNC: 46 U/L (ref 39–117)
ALT SERPL W P-5'-P-CCNC: 24 U/L (ref 1–41)
ANION GAP SERPL CALCULATED.3IONS-SCNC: 10.3 MMOL/L (ref 5–15)
AST SERPL-CCNC: 18 U/L (ref 1–40)
BASOPHILS # BLD AUTO: 0.01 10*3/MM3 (ref 0–0.2)
BASOPHILS NFR BLD AUTO: 0.2 % (ref 0–1.5)
BILIRUB SERPL-MCNC: 0.9 MG/DL (ref 0–1.2)
BUN SERPL-MCNC: 17 MG/DL (ref 6–20)
BUN/CREAT SERPL: 14 (ref 7–25)
CALCIUM SPEC-SCNC: 9.2 MG/DL (ref 8.6–10.5)
CHLORIDE SERPL-SCNC: 104 MMOL/L (ref 98–107)
CO2 SERPL-SCNC: 25.7 MMOL/L (ref 22–29)
CREAT SERPL-MCNC: 1.21 MG/DL (ref 0.76–1.27)
DEPRECATED RDW RBC AUTO: 45.9 FL (ref 37–54)
EGFRCR SERPLBLD CKD-EPI 2021: 81.6 ML/MIN/1.73
EOSINOPHIL # BLD AUTO: 0.06 10*3/MM3 (ref 0–0.4)
EOSINOPHIL NFR BLD AUTO: 1.4 % (ref 0.3–6.2)
ERYTHROCYTE [DISTWIDTH] IN BLOOD BY AUTOMATED COUNT: 13.8 % (ref 12.3–15.4)
GLOBULIN UR ELPH-MCNC: 3.2 GM/DL
GLUCOSE SERPL-MCNC: 94 MG/DL (ref 65–99)
HCT VFR BLD AUTO: 44.3 % (ref 37.5–51)
HGB BLD-MCNC: 14.8 G/DL (ref 13–17.7)
IMM GRANULOCYTES # BLD AUTO: 0.01 10*3/MM3 (ref 0–0.05)
IMM GRANULOCYTES NFR BLD AUTO: 0.2 % (ref 0–0.5)
LYMPHOCYTES # BLD AUTO: 0.81 10*3/MM3 (ref 0.7–3.1)
LYMPHOCYTES NFR BLD AUTO: 19.2 % (ref 19.6–45.3)
MCH RBC QN AUTO: 30 PG (ref 26.6–33)
MCHC RBC AUTO-ENTMCNC: 33.4 G/DL (ref 31.5–35.7)
MCV RBC AUTO: 89.9 FL (ref 79–97)
MONOCYTES # BLD AUTO: 0.31 10*3/MM3 (ref 0.1–0.9)
MONOCYTES NFR BLD AUTO: 7.4 % (ref 5–12)
NEUTROPHILS NFR BLD AUTO: 3.01 10*3/MM3 (ref 1.7–7)
NEUTROPHILS NFR BLD AUTO: 71.6 % (ref 42.7–76)
NRBC BLD AUTO-RTO: 0 /100 WBC (ref 0–0.2)
PLATELET # BLD AUTO: 172 10*3/MM3 (ref 140–450)
PMV BLD AUTO: 10.9 FL (ref 6–12)
POTASSIUM SERPL-SCNC: 4 MMOL/L (ref 3.5–5.2)
PROT SERPL-MCNC: 7.5 G/DL (ref 6–8.5)
RBC # BLD AUTO: 4.93 10*6/MM3 (ref 4.14–5.8)
SODIUM SERPL-SCNC: 140 MMOL/L (ref 136–145)
WBC NRBC COR # BLD: 4.21 10*3/MM3 (ref 3.4–10.8)

## 2023-11-07 PROCEDURE — 83520 IMMUNOASSAY QUANT NOS NONAB: CPT

## 2023-11-07 PROCEDURE — 82397 CHEMILUMINESCENT ASSAY: CPT

## 2023-11-07 PROCEDURE — 80053 COMPREHEN METABOLIC PANEL: CPT

## 2023-11-07 PROCEDURE — 85025 COMPLETE CBC W/AUTO DIFF WBC: CPT

## 2023-11-07 PROCEDURE — 36415 COLL VENOUS BLD VENIPUNCTURE: CPT

## 2023-11-07 PROCEDURE — 86141 C-REACTIVE PROTEIN HS: CPT

## 2023-11-07 NOTE — PROGRESS NOTES
PATIENT INFORMATION  Lawson Felix       - 1990    CHIEF COMPLAINT  Chief Complaint   Patient presents with    Crohn's Disease       HISTORY OF PRESENT ILLNESS    Here today for delayed crohns follow-up    Managed on mercaptopurine for yrs and has tolerated well, last CBC normal, CMP normal lfts. TODAY: reports 1-2 BM every day, no bleeding. Denies cramping, nausea, vomiting.     10/28/2022 Last Colon with 0 adenomas, recall 3 yrs due to crohns, mild inflammation rectum. Normal L and R random biopsies.    Crohn's Disease  Pertinent negatives include no abdominal pain, nausea or vomiting.       REVIEWED PERTINENT RESULTS/ LABS  Lab Results   Component Value Date    CASEREPORT  10/28/2022     Surgical Pathology Report                         Case: [id number]                                  Authorizing Provider:  Mark Cruz        Collected:           10/28/2022 01:10 PM                                 MD Thomas                                                                   Ordering Location:     [facility]   Received:            10/28/2022 01:31 PM                                 OR                                                                           Pathologist:           Prisca Lemos MD                                                          Specimens:   1) - Large Intestine, right colon biopsies                                                          2) - Large Intestine, left colon biopsies                                                           3) - Large Intestine, Rectum, rectal biopsies                                              FINALDX  10/28/2022     1. Colon, Right, Biopsy:  Benign colonic mucosa with  A. No hyperplastic or tubulovillous change.   B. No significant inflammation.   C. No crypt distortion or basement membrane thickening.   D. No viral inclusions or other organisms on routinely stained sections.    2. Colon, Left, Biopsy:  Benign colonic  mucosa with  A. No hyperplastic or tubulovillous change.   B. No significant inflammation.   C. No crypt distortion or basement membrane thickening.   D. No viral inclusions or other organisms on routinely stained sections.    3. Colon, Rectum, Biopsy:  Colonic mucosa with   A. Mild lamina propria fibrosis, focal minimal architectural distortion and rare areas suggesting Paneth cell       metaplasia.   B. No dysplasia nor malignancy.   C. No granulomata, diagnostic viral inclusions nor intestinal parasites identified.     D. No significant inflammation.    [facility]/clm       Lab Results   Component Value Date    TRIG 88 07/25/2023      No results found.    REVIEW OF SYSTEMS  Review of Systems   Constitutional: Negative.    HENT: Negative.     Eyes: Negative.    Respiratory: Negative.     Cardiovascular: Negative.    Gastrointestinal:  Negative for abdominal pain, constipation, diarrhea, nausea and vomiting.        Crohn's    Endocrine: Negative.    Genitourinary: Negative.    Musculoskeletal: Negative.    Skin: Negative.    Allergic/Immunologic: Negative.    Neurological: Negative.    Hematological: Negative.    Psychiatric/Behavioral: Negative.           ACTIVE PROBLEMS  Patient Active Problem List    Diagnosis     Rectal bleeding [K62.5]     Crohn's disease of large intestine without complication [K50.10]          PAST MEDICAL HISTORY  Past Medical History:   Diagnosis Date    Crohn's disease     Sleep apnea          SURGICAL HISTORY  Past Surgical History:   Procedure Laterality Date    COLONOSCOPY  05/16/2016    COLONOSCOPY N/A 10/28/2022    Procedure: COLONOSCOPY WITH BIOPSY;  Surgeon: Mark Cruz MD;  Location: [facility];  Service: Gastroenterology;  Laterality: N/A;  Crohns colitis  right colon biopsies  left colon biopsies  rectal biopsies  normal exam         FAMILY HISTORY  Family History   Problem Relation Age of Onset    Colon cancer Neg Hx     Colon polyps Neg Hx          SOCIAL  "HISTORY  Social History     Occupational History    Not on file   Tobacco Use    Smoking status: Never    Smokeless tobacco: Never   Vaping Use    Vaping Use: Never used   Substance and Sexual Activity    Alcohol use: No    Drug use: Never    Sexual activity: Defer         CURRENT MEDICATIONS    Current Outpatient Medications:     mercaptopurine (PURINETHOL) 50 MG chemo tablet, TAKE 1 TABLET BY MOUTH EVERY DAY, Disp: 90 tablet, Rfl: 3    sertraline (ZOLOFT) 100 MG tablet, Take 1 tablet by mouth Daily., Disp: , Rfl:     ALLERGIES  Patient has no known allergies.    VITALS  Vitals:    11/07/23 0829   BP: 120/82   BP Location: Left arm   Patient Position: Sitting   Cuff Size: Large Adult   Weight: 89.8 kg (198 lb)   Height: 167.6 cm (65.98\")       PHYSICAL EXAM  Debilities/Disabilities Identified: None  Emotional Behavior: Appropriate  Wt Readings from Last 3 Encounters:   11/07/23 89.8 kg (198 lb)   10/28/22 88.2 kg (194 lb 6.4 oz)   11/19/20 92.4 kg (203 lb 12.8 oz)     Ht Readings from Last 1 Encounters:   11/07/23 167.6 cm (65.98\")     Body mass index is 31.97 kg/m².  Physical Exam  Constitutional:       General: He is not in acute distress.     Appearance: Normal appearance. He is not ill-appearing.   HENT:      Head: Normocephalic and atraumatic.      Mouth/Throat:      Mouth: Mucous membranes are moist.      Pharynx: No posterior oropharyngeal erythema.   Eyes:      General: No scleral icterus.  Cardiovascular:      Rate and Rhythm: Normal rate and regular rhythm.      Heart sounds: Normal heart sounds.   Pulmonary:      Effort: Pulmonary effort is normal.      Breath sounds: Normal breath sounds.   Abdominal:      General: Abdomen is flat. Bowel sounds are normal. There is no distension.      Palpations: Abdomen is soft. There is no mass.      Tenderness: There is no abdominal tenderness. There is no guarding or rebound. Negative signs include Watkins's sign.      Hernia: No hernia is present.   Musculoskeletal: "      Cervical back: Neck supple.   Skin:     General: Skin is warm.      Capillary Refill: Capillary refill takes less than 2 seconds.   Neurological:      General: No focal deficit present.      Mental Status: He is alert and oriented to person, place, and time.   Psychiatric:         Mood and Affect: Mood normal.         Behavior: Behavior normal.         Thought Content: Thought content normal.         Judgment: Judgment normal.         CLINICAL DATA REVIEWED   reviewed previous lab results and integrated with today's visit, reviewed notes from other physicians and/or last GI encounter, reviewed previous endoscopy results and available photos, reviewed surgical pathology results from previous biopsies    ASSESSMENT  Diagnoses and all orders for this visit:    Crohn's disease of large intestine without complication  -     [facility] Monitr Crohn's Disease; Future  -     CBC & Differential  -     Comprehensive Metabolic Panel          PLAN    Crohns monitr, CBC, CMP    Return in about 6 months (around 5/7/2024) for crohns.    I have discussed the above plan with the patient.  They verbalize understanding and are in agreement with the plan.  They have been advised to contact the office for any questions, concerns, or changes related to their health.

## 2023-11-20 LAB — REF LAB TEST METHOD: NORMAL

## 2023-12-14 RX ORDER — MERCAPTOPURINE 50 MG/1
TABLET ORAL
Qty: 90 TABLET | Refills: 3 | Status: SHIPPED | OUTPATIENT
Start: 2023-12-14

## 2024-05-09 ENCOUNTER — OFFICE VISIT (OUTPATIENT)
Dept: GASTROENTEROLOGY | Facility: CLINIC | Age: 34
End: 2024-05-09
Payer: COMMERCIAL

## 2024-05-09 VITALS
BODY MASS INDEX: 31.72 KG/M2 | DIASTOLIC BLOOD PRESSURE: 78 MMHG | SYSTOLIC BLOOD PRESSURE: 110 MMHG | HEIGHT: 66 IN | WEIGHT: 197.4 LBS

## 2024-05-09 DIAGNOSIS — K50.10 CROHN'S DISEASE OF LARGE INTESTINE WITHOUT COMPLICATION: Primary | ICD-10-CM

## 2024-05-09 PROCEDURE — 99213 OFFICE O/P EST LOW 20 MIN: CPT | Performed by: INTERNAL MEDICINE

## 2024-05-09 RX ORDER — FLUTICASONE PROPIONATE 50 MCG
2 SPRAY, SUSPENSION (ML) NASAL DAILY
COMMUNITY

## 2024-11-21 ENCOUNTER — OFFICE VISIT (OUTPATIENT)
Dept: GASTROENTEROLOGY | Facility: CLINIC | Age: 34
End: 2024-11-21
Payer: COMMERCIAL

## 2024-11-21 VITALS
SYSTOLIC BLOOD PRESSURE: 120 MMHG | DIASTOLIC BLOOD PRESSURE: 82 MMHG | BODY MASS INDEX: 32.37 KG/M2 | HEIGHT: 66 IN | WEIGHT: 201.4 LBS

## 2024-11-21 DIAGNOSIS — K50.10 CROHN'S DISEASE OF LARGE INTESTINE WITHOUT COMPLICATION: Primary | ICD-10-CM

## 2024-11-21 PROCEDURE — 99213 OFFICE O/P EST LOW 20 MIN: CPT | Performed by: INTERNAL MEDICINE

## 2024-11-21 NOTE — PROGRESS NOTES
PATIENT INFORMATION  Lawson Felix       - 1990    CHIEF COMPLAINT  Chief Complaint   Patient presents with    Crohn's Disease       HISTORY OF PRESENT ILLNESS  6 MOnth appt for his well managed crohn's on Mercaptopurine     Never any issues with leukopenia and he denies any issues- greater than 8 years of dx so his recal lis 3 year from 10/2022    Reviewed his only labs this year were TCHol at 199 is better.    Ha never seen Derm and feel with the continued 6MP should have a baseline as well as a contact for any new lesions( NMSC)        REVIEWED PERTINENT RESULTS/ LABS  Lab Results   Component Value Date    CASEREPORT  10/28/2022     Surgical Pathology Report                         Case: [id number]                                  Authorizing Provider:  Mark Cruz        Collected:           10/28/2022 01:10 PM                                 MD Thomas                                                                   Ordering Location:     [facility]   Received:            10/28/2022 01:31 PM                                 OR                                                                           Pathologist:           Prisca Lemos MD                                                          Specimens:   1) - Large Intestine, right colon biopsies                                                          2) - Large Intestine, left colon biopsies                                                           3) - Large Intestine, Rectum, rectal biopsies                                              FINALDX  10/28/2022     1. Colon, Right, Biopsy:  Benign colonic mucosa with  A. No hyperplastic or tubulovillous change.   B. No significant inflammation.   C. No crypt distortion or basement membrane thickening.   D. No viral inclusions or other organisms on routinely stained sections.    2. Colon, Left, Biopsy:  Benign colonic mucosa with  A. No hyperplastic or tubulovillous  change.   B. No significant inflammation.   C. No crypt distortion or basement membrane thickening.   D. No viral inclusions or other organisms on routinely stained sections.    3. Colon, Rectum, Biopsy:  Colonic mucosa with   A. Mild lamina propria fibrosis, focal minimal architectural distortion and rare areas suggesting Paneth cell       metaplasia.   B. No dysplasia nor malignancy.   C. No granulomata, diagnostic viral inclusions nor intestinal parasites identified.     D. No significant inflammation.    [facility]/clm       Lab Results   Component Value Date    HGB 14.8 11/07/2023    MCV 89.9 11/07/2023     11/07/2023    ALT 24 11/07/2023    AST 18 11/07/2023    TRIG 88 07/25/2023      No results found.    REVIEW OF SYSTEMS  Review of Systems   Constitutional:  Negative for activity change, chills, fever and unexpected weight change.   HENT:  Negative for congestion.    Eyes:  Negative for visual disturbance.   Respiratory:  Negative for shortness of breath.    Cardiovascular:  Negative for chest pain and palpitations.   Gastrointestinal:  Positive for blood in stool and diarrhea. Negative for abdominal pain.   Endocrine: Negative for cold intolerance and heat intolerance.   Genitourinary:  Negative for hematuria.   Musculoskeletal:  Negative for gait problem.   Skin:  Negative for color change.   Allergic/Immunologic: Negative for immunocompromised state.   Neurological:  Negative for weakness and light-headedness.   Hematological:  Negative for adenopathy.   Psychiatric/Behavioral:  Negative for sleep disturbance. The patient is not nervous/anxious.          ACTIVE PROBLEMS  Patient Active Problem List    Diagnosis     Rectal bleeding [K62.5]     Crohn's disease of large intestine without complication [K50.10]          PAST MEDICAL HISTORY  Past Medical History:   Diagnosis Date    Crohn's disease     Sleep apnea          SURGICAL HISTORY  Past Surgical History:   Procedure Laterality Date    COLONOSCOPY   "05/16/2016    COLONOSCOPY N/A 10/28/2022    Procedure: COLONOSCOPY WITH BIOPSY;  Surgeon: Mark Cruz MD;  Location: [facility];  Service: Gastroenterology;  Laterality: N/A;  Crohns colitis  right colon biopsies  left colon biopsies  rectal biopsies  normal exam         FAMILY HISTORY  Family History   Problem Relation Age of Onset    Colon cancer Neg Hx     Colon polyps Neg Hx          SOCIAL HISTORY  Social History     Occupational History    Not on file   Tobacco Use    Smoking status: Never    Smokeless tobacco: Never   Vaping Use    Vaping status: Never Used   Substance and Sexual Activity    Alcohol use: Never    Drug use: Never    Sexual activity: Not Currently     Partners: Female         CURRENT MEDICATIONS    Current Outpatient Medications:     fluticasone (FLONASE) 50 MCG/ACT nasal spray, 2 sprays by Each Nare route Daily., Disp: , Rfl:     mercaptopurine (PURINETHOL) 50 MG chemo tablet, TAKE 1 TABLET BY MOUTH EVERY DAY, Disp: 90 tablet, Rfl: 3    sertraline (ZOLOFT) 100 MG tablet, Take 1 tablet by mouth Daily., Disp: , Rfl:     ALLERGIES  Patient has no known allergies.    VITALS  Vitals:    11/21/24 0909   BP: 120/82   BP Location: Left arm   Patient Position: Sitting   Cuff Size: Large Adult   Weight: 91.4 kg (201 lb 6.4 oz)   Height: 167.6 cm (66\")       PHYSICAL EXAM  Debilities/Disabilities Identified: None  Emotional Behavior: Appropriate  Wt Readings from Last 3 Encounters:   11/21/24 91.4 kg (201 lb 6.4 oz)   05/09/24 89.5 kg (197 lb 6.4 oz)   11/07/23 89.8 kg (198 lb)     Ht Readings from Last 1 Encounters:   11/21/24 167.6 cm (66\")     Body mass index is 32.51 kg/m².  Physical Exam  Constitutional:       Appearance: He is well-developed. He is not diaphoretic.   HENT:      Head: Normocephalic and atraumatic.   Eyes:      General: No scleral icterus.     Conjunctiva/sclera: Conjunctivae normal.      Pupils: Pupils are equal, round, and reactive to light.   Neck:      Thyroid: No " thyromegaly.   Cardiovascular:      Rate and Rhythm: Normal rate and regular rhythm.      Heart sounds: Normal heart sounds. No murmur heard.     No gallop.   Pulmonary:      Effort: Pulmonary effort is normal.      Breath sounds: Normal breath sounds. No wheezing or rales.   Abdominal:      General: Bowel sounds are normal. There is no distension or abdominal bruit.      Palpations: Abdomen is soft. There is no shifting dullness, fluid wave or mass.      Tenderness: There is no abdominal tenderness. There is no guarding. Negative signs include Watkins's sign.      Hernia: There is no hernia in the ventral area.   Musculoskeletal:         General: Normal range of motion.      Cervical back: Normal range of motion and neck supple.   Lymphadenopathy:      Cervical: No cervical adenopathy.   Skin:     General: Skin is warm and dry.      Findings: No erythema or rash.   Neurological:      Mental Status: He is alert and oriented to person, place, and time.   Psychiatric:         Mood and Affect: Mood normal.         Behavior: Behavior normal.         CLINICAL DATA REVIEWED   reviewed previous lab results and integrated with today's visit, reviewed notes from other physicians and/or last GI encounter, reviewed previous endoscopy results and available photos, reviewed surgical pathology results from previous biopsies    ASSESSMENT  Diagnoses and all orders for this visit:    Crohn's disease of large intestine without complication          PLAN  No change for now  Refer to Derm- prefers [place] so will defer to Dr Herrmann to find an available Dermatologist  Next Colon is 10 / 2025  Return in about 6 months (around 5/21/2025).    I have discussed the above plan with the patient.  They verbalize understanding and are in agreement with the plan.  They have been advised to contact the office for any questions, concerns, or changes related to their health.

## 2025-01-07 RX ORDER — MERCAPTOPURINE 50 MG/1
TABLET ORAL
Qty: 30 TABLET | Refills: 11 | Status: SHIPPED | OUTPATIENT
Start: 2025-01-07

## 2025-04-25 ENCOUNTER — TELEPHONE (OUTPATIENT)
Dept: GASTROENTEROLOGY | Facility: CLINIC | Age: 35
End: 2025-04-25
Payer: COMMERCIAL

## 2025-04-25 NOTE — TELEPHONE ENCOUNTER
MOTHER CALLED    PT DOESN'T KNOW WHY HE WAS REF TO A DERMATOLOGIST  SHE WOULD LIKE SOMEONE TO CALL HER BACK AND GO OVER IT WITH HER  [phone]

## 2025-04-25 NOTE — TELEPHONE ENCOUNTER
Per LOV, patient should have baseline skin check while on medication for crohn's.   Patient mother gave verbal understanding.

## 2025-07-03 ENCOUNTER — OFFICE VISIT (OUTPATIENT)
Dept: GASTROENTEROLOGY | Facility: CLINIC | Age: 35
End: 2025-07-03
Payer: COMMERCIAL

## 2025-07-03 VITALS
BODY MASS INDEX: 31.02 KG/M2 | HEIGHT: 66 IN | DIASTOLIC BLOOD PRESSURE: 82 MMHG | WEIGHT: 193 LBS | SYSTOLIC BLOOD PRESSURE: 132 MMHG

## 2025-07-03 DIAGNOSIS — K50.10 CROHN'S DISEASE OF LARGE INTESTINE WITHOUT COMPLICATION: Primary | ICD-10-CM

## 2025-07-03 DIAGNOSIS — K62.5 RECTAL BLEEDING: ICD-10-CM

## 2025-07-03 PROCEDURE — 99214 OFFICE O/P EST MOD 30 MIN: CPT | Performed by: INTERNAL MEDICINE

## 2025-07-03 RX ORDER — HYDROCORTISONE 25 MG/G
1 OINTMENT TOPICAL 2 TIMES DAILY
Qty: 30 G | Refills: 11 | Status: SHIPPED | OUTPATIENT
Start: 2025-07-03

## 2025-07-03 NOTE — PROGRESS NOTES
PATIENT INFORMATION  Lawson Felix       - 1990    CHIEF COMPLAINT  Chief Complaint   Patient presents with    Crohn's Disease       HISTORY OF PRESENT ILLNESS  6 month follow up and no labs since last summer alba repeat his CBC today and is due for his 3 year colon in 10/2025    No change in bowel pattern and only Blood on the TP so will treat topically        REVIEWED PERTINENT RESULTS/ LABS  Lab Results   Component Value Date    CASEREPORT  10/28/2022     Surgical Pathology Report                         Case: [id number]                                  Authorizing Provider:  Mark Cruz        Collected:           10/28/2022 01:10 PM                                 MD Thomas                                                                   Ordering Location:     [facility]   Received:            10/28/2022 01:31 PM                                 OR                                                                           Pathologist:           Prisca Lemos MD                                                          Specimens:   1) - Large Intestine, right colon biopsies                                                          2) - Large Intestine, left colon biopsies                                                           3) - Large Intestine, Rectum, rectal biopsies                                              FINALDX  10/28/2022     1. Colon, Right, Biopsy:  Benign colonic mucosa with  A. No hyperplastic or tubulovillous change.   B. No significant inflammation.   C. No crypt distortion or basement membrane thickening.   D. No viral inclusions or other organisms on routinely stained sections.    2. Colon, Left, Biopsy:  Benign colonic mucosa with  A. No hyperplastic or tubulovillous change.   B. No significant inflammation.   C. No crypt distortion or basement membrane thickening.   D. No viral inclusions or other organisms on routinely stained sections.    3. Colon,  Rectum, Biopsy:  Colonic mucosa with   A. Mild lamina propria fibrosis, focal minimal architectural distortion and rare areas suggesting Paneth cell       metaplasia.   B. No dysplasia nor malignancy.   C. No granulomata, diagnostic viral inclusions nor intestinal parasites identified.     D. No significant inflammation.    [facility]/clm       Lab Results   Component Value Date    HGB 14.8 11/07/2023    MCV 89.9 11/07/2023     11/07/2023    ALT 24 11/07/2023    AST 18 11/07/2023    TRIG 88 07/25/2023      No results found.    REVIEW OF SYSTEMS  Review of Systems   Gastrointestinal:  Positive for anal bleeding and constipation.         ACTIVE PROBLEMS  Patient Active Problem List    Diagnosis     Rectal bleeding [K62.5]     Crohn's disease of large intestine without complication [K50.10]          PAST MEDICAL HISTORY  Past Medical History:   Diagnosis Date    Crohn's disease     Sleep apnea          SURGICAL HISTORY  Past Surgical History:   Procedure Laterality Date    COLONOSCOPY  05/16/2016    COLONOSCOPY N/A 10/28/2022    Procedure: COLONOSCOPY WITH BIOPSY;  Surgeon: Mark Cruz MD;  Location: [facility];  Service: Gastroenterology;  Laterality: N/A;  Crohns colitis  right colon biopsies  left colon biopsies  rectal biopsies  normal exam         FAMILY HISTORY  Family History   Problem Relation Age of Onset    Colon cancer Neg Hx     Colon polyps Neg Hx          SOCIAL HISTORY  Social History     Occupational History    Not on file   Tobacco Use    Smoking status: Never    Smokeless tobacco: Never   Vaping Use    Vaping status: Never Used   Substance and Sexual Activity    Alcohol use: Never    Drug use: Never    Sexual activity: Not Currently     Partners: Female     Birth control/protection: Condom, Spermicide         CURRENT MEDICATIONS    Current Outpatient Medications:     fluticasone (FLONASE) 50 MCG/ACT nasal spray, 2 sprays by Each Nare route Daily., Disp: , Rfl:     mercaptopurine  "(PURINETHOL) 50 MG chemo tablet, TAKE 1 TABLET BY MOUTH EVERY DAY, Disp: 30 tablet, Rfl: 11    sertraline (ZOLOFT) 100 MG tablet, Take 1 tablet by mouth Daily., Disp: , Rfl:     hydrocortisone 2.5 % ointment, Apply 1 Application topically to the appropriate area as directed 2 (Two) Times a Day., Disp: 30 g, Rfl: 11    ALLERGIES  Patient has no known allergies.    VITALS  Vitals:    07/03/25 0957   BP: 132/82   Weight: 87.5 kg (193 lb)   Height: 167.6 cm (66\")       PHYSICAL EXAM  Debilities/Disabilities Identified: None  Emotional Behavior: Appropriate  Wt Readings from Last 3 Encounters:   07/03/25 87.5 kg (193 lb)   11/21/24 91.4 kg (201 lb 6.4 oz)   05/09/24 89.5 kg (197 lb 6.4 oz)     Ht Readings from Last 1 Encounters:   07/03/25 167.6 cm (66\")     Body mass index is 31.15 kg/m².  Physical Exam  Constitutional:       Appearance: He is well-developed. He is not diaphoretic.   HENT:      Head: Normocephalic and atraumatic.   Eyes:      General: No scleral icterus.     Conjunctiva/sclera: Conjunctivae normal.      Pupils: Pupils are equal, round, and reactive to light.   Neck:      Thyroid: No thyromegaly.   Cardiovascular:      Rate and Rhythm: Normal rate and regular rhythm.      Heart sounds: Normal heart sounds. No murmur heard.     No gallop.   Pulmonary:      Effort: Pulmonary effort is normal.      Breath sounds: Normal breath sounds. No wheezing or rales.   Abdominal:      General: Bowel sounds are normal. There is no distension or abdominal bruit.      Palpations: Abdomen is soft. There is no shifting dullness, fluid wave or mass.      Tenderness: There is no abdominal tenderness. There is no guarding. Negative signs include Watkins's sign.      Hernia: There is no hernia in the ventral area.   Musculoskeletal:         General: Normal range of motion.      Cervical back: Normal range of motion and neck supple.   Lymphadenopathy:      Cervical: No cervical adenopathy.   Skin:     General: Skin is warm and " dry.      Findings: No erythema or rash.   Neurological:      Mental Status: He is alert and oriented to person, place, and time.   Psychiatric:         Mood and Affect: Mood normal.         Behavior: Behavior normal.         CLINICAL DATA REVIEWED   reviewed previous lab results and integrated with today's visit, reviewed notes from other physicians and/or last GI encounter, reviewed previous endoscopy results and available photos, reviewed surgical pathology results from previous biopsies    ASSESSMENT  Diagnoses and all orders for this visit:    Crohn's disease of large intestine without complication  -     CBC & Differential; Future  -     Case Request; Standing  -     Case Request    Rectal bleeding  -     CBC & Differential; Future    Other orders  -     Follow Anesthesia Guidelines / Protocol; Future  -     Verify bowel prep was successful; Standing  -     Give tap water enema if bowel prep was insufficient; Standing  -     Obtain Informed Consent; Standing  -     hydrocortisone 2.5 % ointment; Apply 1 Application topically to the appropriate area as directed 2 (Two) Times a Day.          PLAN  Return in about 6 months (around 1/3/2026).    I have discussed the above plan with the patient.  They verbalize understanding and are in agreement with the plan.  They have been advised to contact the office for any questions, concerns, or changes related to their health.

## 2025-07-24 ENCOUNTER — PATIENT MESSAGE (OUTPATIENT)
Dept: GASTROENTEROLOGY | Facility: CLINIC | Age: 35
End: 2025-07-24
Payer: COMMERCIAL

## (undated) DEVICE — JACKT LAB F/R KNIT CUFF/COLR XLG BLU

## (undated) DEVICE — Device

## (undated) DEVICE — FRCP BX RADJAW4 NDL 2.8 240CM LG OG BX40

## (undated) DEVICE — VIAL FORMALIN CAP 10P 40ML

## (undated) DEVICE — ADAPT CLN BIOGUARD AIR/H2O DISP

## (undated) DEVICE — KT ORCA ORCAPOD DISP STRL

## (undated) DEVICE — GLV SURG SENSICARE PI MIC PF SZ7.5 LF STRL

## (undated) DEVICE — BW-412T DISP COMBO CLEANING BRUSH: Brand: SINGLE USE COMBINATION CLEANING BRUSH

## (undated) DEVICE — SYR LL 3CC

## (undated) DEVICE — SAFELINER SUCTION CANISTER 1000CC: Brand: DEROYAL